# Patient Record
Sex: FEMALE | URBAN - METROPOLITAN AREA
[De-identification: names, ages, dates, MRNs, and addresses within clinical notes are randomized per-mention and may not be internally consistent; named-entity substitution may affect disease eponyms.]

---

## 2021-07-30 ENCOUNTER — APPOINTMENT (RX ONLY)
Dept: URBAN - METROPOLITAN AREA CLINIC 147 | Facility: CLINIC | Age: 56
Setting detail: DERMATOLOGY
End: 2021-07-30

## 2021-07-30 DIAGNOSIS — Z41.9 ENCOUNTER FOR PROCEDURE FOR PURPOSES OTHER THAN REMEDYING HEALTH STATE, UNSPECIFIED: ICD-10-CM

## 2021-07-30 PROCEDURE — ? BOTOX (U OR CC)

## 2021-07-30 ASSESSMENT — LOCATION DETAILED DESCRIPTION DERM
LOCATION DETAILED: MEDIAL FRONTAL SCALP
LOCATION DETAILED: LEFT LATERAL FRONTAL SCALP
LOCATION DETAILED: SUPERIOR MID FOREHEAD
LOCATION DETAILED: LEFT INFERIOR FOREHEAD
LOCATION DETAILED: LEFT MEDIAL ZYGOMA
LOCATION DETAILED: RIGHT MEDIAL ZYGOMA
LOCATION DETAILED: LEFT INFERIOR MEDIAL FOREHEAD
LOCATION DETAILED: LEFT EYEBROW
LOCATION DETAILED: GLABELLA
LOCATION DETAILED: RIGHT SUPERIOR FOREHEAD
LOCATION DETAILED: RIGHT INFERIOR FOREHEAD
LOCATION DETAILED: RIGHT CENTRAL FRONTAL SCALP
LOCATION DETAILED: RIGHT INFERIOR MEDIAL FOREHEAD
LOCATION DETAILED: RIGHT EYEBROW
LOCATION DETAILED: LEFT CENTRAL FRONTAL SCALP

## 2021-07-30 ASSESSMENT — LOCATION SIMPLE DESCRIPTION DERM
LOCATION SIMPLE: FRONTAL SCALP
LOCATION SIMPLE: RIGHT EYEBROW
LOCATION SIMPLE: RIGHT ZYGOMA
LOCATION SIMPLE: GLABELLA
LOCATION SIMPLE: LEFT FOREHEAD
LOCATION SIMPLE: LEFT EYEBROW
LOCATION SIMPLE: LEFT SCALP
LOCATION SIMPLE: LEFT ZYGOMA
LOCATION SIMPLE: SUPERIOR FOREHEAD
LOCATION SIMPLE: RIGHT FOREHEAD
LOCATION SIMPLE: RIGHT SCALP

## 2021-07-30 ASSESSMENT — LOCATION ZONE DERM
LOCATION ZONE: FACE
LOCATION ZONE: SCALP

## 2021-07-30 NOTE — PROCEDURE: BOTOX (U OR CC)
Quantity Per Injection Site (Units Or Cc): 1 U
Additional Area 2 Units: 0
Dilution (U/0.1 Cc): 4
Quantity Per Injection Site (Units Or Cc): 2 U
Post-Care Instructions: Patient instructed to not lie down for 4 hours and limit physical activity for 24 hours.
Quantity Per Injection Site (Units Or Cc): 3 U
Consent: Written consent obtained. Risks include but not limited to lid/brow ptosis, bruising, swelling, diplopia, temporary effect, incomplete chemical denervation.
Detail Level: Detailed
Price (Use Numbers Only, No Special Characters Or $): 521
Document As Units Or Cc?: units
Quantity Per Injection Site (Units Or Cc): 4 U
Including Pricing Information In The Note: No